# Patient Record
Sex: MALE | HISPANIC OR LATINO | Employment: FULL TIME | URBAN - METROPOLITAN AREA
[De-identification: names, ages, dates, MRNs, and addresses within clinical notes are randomized per-mention and may not be internally consistent; named-entity substitution may affect disease eponyms.]

---

## 2022-12-12 ENCOUNTER — HOSPITAL ENCOUNTER (EMERGENCY)
Facility: CLINIC | Age: 32
Discharge: HOME | End: 2022-12-12
Attending: EMERGENCY MEDICINE | Admitting: EMERGENCY MEDICINE
Payer: COMMERCIAL

## 2022-12-12 VITALS
OXYGEN SATURATION: 99 % | RESPIRATION RATE: 20 BRPM | HEART RATE: 81 BPM | DIASTOLIC BLOOD PRESSURE: 79 MMHG | SYSTOLIC BLOOD PRESSURE: 136 MMHG | TEMPERATURE: 97 F

## 2022-12-12 DIAGNOSIS — K29.70 GASTRITIS WITHOUT BLEEDING, UNSPECIFIED CHRONICITY, UNSPECIFIED GASTRITIS TYPE: ICD-10-CM

## 2022-12-12 DIAGNOSIS — R10.9 ACUTE ABDOMINAL PAIN: ICD-10-CM

## 2022-12-12 LAB
ALBUMIN SERPL BCG-MCNC: 4.4 G/DL (ref 3.5–5.2)
ALBUMIN UR-MCNC: 50 MG/DL
ALP SERPL-CCNC: 117 U/L (ref 40–129)
ALT SERPL W P-5'-P-CCNC: 21 U/L (ref 10–50)
ANION GAP SERPL CALCULATED.3IONS-SCNC: 10 MMOL/L (ref 7–15)
APPEARANCE UR: CLEAR
AST SERPL W P-5'-P-CCNC: 23 U/L (ref 10–50)
ATRIAL RATE - MUSE: 63 BPM
BACTERIA #/AREA URNS HPF: ABNORMAL /HPF
BASOPHILS # BLD AUTO: 0 10E3/UL (ref 0–0.2)
BASOPHILS NFR BLD AUTO: 1 %
BILIRUB DIRECT SERPL-MCNC: <0.2 MG/DL (ref 0–0.3)
BILIRUB SERPL-MCNC: 0.2 MG/DL
BILIRUB UR QL STRIP: NEGATIVE
BUN SERPL-MCNC: 12.8 MG/DL (ref 6–20)
CALCIUM SERPL-MCNC: 9.4 MG/DL (ref 8.6–10)
CHLORIDE SERPL-SCNC: 102 MMOL/L (ref 98–107)
COLOR UR AUTO: YELLOW
CREAT SERPL-MCNC: 0.96 MG/DL (ref 0.67–1.17)
DEPRECATED HCO3 PLAS-SCNC: 29 MMOL/L (ref 22–29)
DIASTOLIC BLOOD PRESSURE - MUSE: NORMAL MMHG
EOSINOPHIL # BLD AUTO: 0 10E3/UL (ref 0–0.7)
EOSINOPHIL NFR BLD AUTO: 1 %
ERYTHROCYTE [DISTWIDTH] IN BLOOD BY AUTOMATED COUNT: 13.4 % (ref 10–15)
FLUAV RNA SPEC QL NAA+PROBE: NEGATIVE
FLUBV RNA RESP QL NAA+PROBE: NEGATIVE
GFR SERPL CREATININE-BSD FRML MDRD: >90 ML/MIN/1.73M2
GLUCOSE SERPL-MCNC: 126 MG/DL (ref 70–99)
GLUCOSE UR STRIP-MCNC: NEGATIVE MG/DL
HCT VFR BLD AUTO: 46.4 % (ref 40–53)
HGB BLD-MCNC: 15 G/DL (ref 13.3–17.7)
HGB UR QL STRIP: NEGATIVE
HOLD SPECIMEN: NORMAL
HOLD SPECIMEN: NORMAL
IMM GRANULOCYTES # BLD: 0 10E3/UL
IMM GRANULOCYTES NFR BLD: 0 %
INTERPRETATION ECG - MUSE: NORMAL
KETONES UR STRIP-MCNC: NEGATIVE MG/DL
LEUKOCYTE ESTERASE UR QL STRIP: NEGATIVE
LIPASE SERPL-CCNC: 31 U/L (ref 13–60)
LYMPHOCYTES # BLD AUTO: 2.5 10E3/UL (ref 0.8–5.3)
LYMPHOCYTES NFR BLD AUTO: 44 %
MCH RBC QN AUTO: 25.4 PG (ref 26.5–33)
MCHC RBC AUTO-ENTMCNC: 32.3 G/DL (ref 31.5–36.5)
MCV RBC AUTO: 79 FL (ref 78–100)
MONOCYTES # BLD AUTO: 0.5 10E3/UL (ref 0–1.3)
MONOCYTES NFR BLD AUTO: 8 %
MUCOUS THREADS #/AREA URNS LPF: PRESENT /LPF
NEUTROPHILS # BLD AUTO: 2.6 10E3/UL (ref 1.6–8.3)
NEUTROPHILS NFR BLD AUTO: 46 %
NITRATE UR QL: NEGATIVE
NRBC # BLD AUTO: 0 10E3/UL
NRBC BLD AUTO-RTO: 0 /100
P AXIS - MUSE: 47 DEGREES
PH UR STRIP: 6.5 [PH] (ref 5–7)
PLATELET # BLD AUTO: 227 10E3/UL (ref 150–450)
POTASSIUM SERPL-SCNC: 3.8 MMOL/L (ref 3.4–5.3)
PR INTERVAL - MUSE: 150 MS
PROT SERPL-MCNC: 7.7 G/DL (ref 6.4–8.3)
QRS DURATION - MUSE: 84 MS
QT - MUSE: 396 MS
QTC - MUSE: 405 MS
R AXIS - MUSE: 83 DEGREES
RBC # BLD AUTO: 5.91 10E6/UL (ref 4.4–5.9)
RBC URINE: 3 /HPF
RSV RNA SPEC NAA+PROBE: NEGATIVE
SARS-COV-2 RNA RESP QL NAA+PROBE: NEGATIVE
SODIUM SERPL-SCNC: 141 MMOL/L (ref 136–145)
SP GR UR STRIP: 1.03 (ref 1–1.03)
SPERM #/AREA URNS HPF: PRESENT /HPF
SQUAMOUS EPITHELIAL: <1 /HPF
SYSTOLIC BLOOD PRESSURE - MUSE: NORMAL MMHG
T AXIS - MUSE: 57 DEGREES
UROBILINOGEN UR STRIP-MCNC: 2 MG/DL
VENTRICULAR RATE- MUSE: 63 BPM
WBC # BLD AUTO: 5.6 10E3/UL (ref 4–11)
WBC URINE: 2 /HPF

## 2022-12-12 PROCEDURE — 250N000011 HC RX IP 250 OP 636: Performed by: EMERGENCY MEDICINE

## 2022-12-12 PROCEDURE — 36415 COLL VENOUS BLD VENIPUNCTURE: CPT | Performed by: EMERGENCY MEDICINE

## 2022-12-12 PROCEDURE — 99284 EMERGENCY DEPT VISIT MOD MDM: CPT | Mod: 25

## 2022-12-12 PROCEDURE — 87637 SARSCOV2&INF A&B&RSV AMP PRB: CPT | Performed by: EMERGENCY MEDICINE

## 2022-12-12 PROCEDURE — 96375 TX/PRO/DX INJ NEW DRUG ADDON: CPT

## 2022-12-12 PROCEDURE — 85025 COMPLETE CBC W/AUTO DIFF WBC: CPT | Performed by: EMERGENCY MEDICINE

## 2022-12-12 PROCEDURE — 93005 ELECTROCARDIOGRAM TRACING: CPT

## 2022-12-12 PROCEDURE — 96374 THER/PROPH/DIAG INJ IV PUSH: CPT

## 2022-12-12 PROCEDURE — 80053 COMPREHEN METABOLIC PANEL: CPT | Performed by: EMERGENCY MEDICINE

## 2022-12-12 PROCEDURE — 81003 URINALYSIS AUTO W/O SCOPE: CPT | Performed by: EMERGENCY MEDICINE

## 2022-12-12 PROCEDURE — 258N000003 HC RX IP 258 OP 636: Performed by: EMERGENCY MEDICINE

## 2022-12-12 PROCEDURE — C9803 HOPD COVID-19 SPEC COLLECT: HCPCS

## 2022-12-12 PROCEDURE — 82248 BILIRUBIN DIRECT: CPT | Performed by: EMERGENCY MEDICINE

## 2022-12-12 PROCEDURE — 96361 HYDRATE IV INFUSION ADD-ON: CPT

## 2022-12-12 PROCEDURE — 83690 ASSAY OF LIPASE: CPT | Performed by: EMERGENCY MEDICINE

## 2022-12-12 RX ORDER — ONDANSETRON 2 MG/ML
4 INJECTION INTRAMUSCULAR; INTRAVENOUS ONCE
Status: COMPLETED | OUTPATIENT
Start: 2022-12-12 | End: 2022-12-12

## 2022-12-12 RX ORDER — KETOROLAC TROMETHAMINE 15 MG/ML
10 INJECTION, SOLUTION INTRAMUSCULAR; INTRAVENOUS ONCE
Status: COMPLETED | OUTPATIENT
Start: 2022-12-12 | End: 2022-12-12

## 2022-12-12 RX ORDER — FAMOTIDINE 20 MG/1
20 TABLET, FILM COATED ORAL 2 TIMES DAILY
Qty: 40 TABLET | Refills: 0 | Status: SHIPPED | OUTPATIENT
Start: 2022-12-12

## 2022-12-12 RX ORDER — ONDANSETRON 4 MG/1
4 TABLET, ORALLY DISINTEGRATING ORAL EVERY 8 HOURS PRN
Qty: 10 TABLET | Refills: 0 | Status: SHIPPED | OUTPATIENT
Start: 2022-12-12 | End: 2022-12-15

## 2022-12-12 RX ADMIN — KETOROLAC TROMETHAMINE 10 MG: 15 INJECTION, SOLUTION INTRAMUSCULAR; INTRAVENOUS at 07:09

## 2022-12-12 RX ADMIN — ONDANSETRON 4 MG: 2 INJECTION INTRAMUSCULAR; INTRAVENOUS at 05:06

## 2022-12-12 RX ADMIN — FAMOTIDINE 20 MG: 10 INJECTION, SOLUTION INTRAVENOUS at 07:10

## 2022-12-12 RX ADMIN — SODIUM CHLORIDE 1000 ML: 9 INJECTION, SOLUTION INTRAVENOUS at 05:06

## 2022-12-12 RX ADMIN — ONDANSETRON 4 MG: 2 INJECTION INTRAMUSCULAR; INTRAVENOUS at 07:10

## 2022-12-12 ASSESSMENT — ENCOUNTER SYMPTOMS
NAUSEA: 1
ABDOMINAL PAIN: 1
CONSTIPATION: 0
VOMITING: 1
DIARRHEA: 0
FEVER: 0

## 2022-12-12 ASSESSMENT — ACTIVITIES OF DAILY LIVING (ADL): ADLS_ACUITY_SCORE: 35

## 2022-12-12 NOTE — LETTER
December 12, 2022      To Whom It May Concern:      Skyla Powers was seen in our Emergency Department today, 12/12/22.  I expect his condition to improve over the next 1-2 days.  He may return to work/school when improved.    Sincerely,        Lorrie Aguilar RN

## 2022-12-12 NOTE — ED PROVIDER NOTES
History   Chief Complaint:  Abdominal Pain      HPI   Skyla Powers is a 32 year old male who presents for evaluation of abdominal pain. Yesterday evening around 2200 the patient started to develop generalize abdominal pain with associated nausea and vomiting. Due to his ongoing symptoms this morning he came into the ED for evaluation. He denies any recent fever, diarrhea, or constipation. He expresses concern that his symptoms could be related to eating some bad chicken.     Review of Systems   Constitutional: Negative for fever.   Gastrointestinal: Positive for abdominal pain, nausea and vomiting. Negative for constipation and diarrhea.   All other systems reviewed and are negative.      Allergies:  Penicillins     Medications:  The patient is not currently taking any prescribed medications.     Past Medical History:     The patient denies any past medical history.      Past Surgical History:    The patient denies any past surgical history.      Social History:  The patient presents to the ED accompanied by a family member.       Physical Exam     Patient Vitals for the past 24 hrs:   BP Temp Temp src Pulse Resp SpO2   12/12/22 0455 136/79 97  F (36.1  C) Temporal 81 20 99 %       Physical Exam  Constitutional:  Oriented to person, place, and time.   HENT:   Head:    Normocephalic.   Mouth/Throat:   Oropharynx is clear and moist.   Eyes:    EOM are normal. Pupils are equal, round, and reactive to light.   Neck:    Neck supple.   Cardiovascular:  Normal rate, regular rhythm and normal heart sounds.      Exam reveals no gallop and no friction rub.       No murmur heard.  Pulmonary/Chest:  Effort normal and breath sounds normal.      No respiratory distress. No wheezes. No rales.      No reproducible chest wall pain.  Abdominal:   Soft. No distension. No tenderness. No rebound and no guarding. Negative Jefferson's sign. No pain with percussion to flanks.   Musculoskeletal:  Normal range of motion.    Neurological:   Alert and oriented to person, place, and time.           Moves all 4 extremities spontaneously    Skin:    No rash noted. No pallor.      Emergency Department Course   ECG  ECG results from 12/12/22   EKG 12 lead     Value    Systolic Blood Pressure     Diastolic Blood Pressure     Ventricular Rate 63    Atrial Rate 63    VT Interval 150    QRS Duration 84        QTc 405    P Axis 47    R AXIS 83    T Axis 57    Interpretation ECG      Sinus rhythm with sinus arrhythmia  Normal ECG  No previous ECGs available             Laboratory:  Labs Ordered and Resulted from Time of ED Arrival to Time of ED Departure   BASIC METABOLIC PANEL - Abnormal       Result Value    Sodium 141      Potassium 3.8      Chloride 102      Carbon Dioxide (CO2) 29      Anion Gap 10      Urea Nitrogen 12.8      Creatinine 0.96      Calcium 9.4      Glucose 126 (*)     GFR Estimate >90     CBC WITH PLATELETS AND DIFFERENTIAL - Abnormal    WBC Count 5.6      RBC Count 5.91 (*)     Hemoglobin 15.0      Hematocrit 46.4      MCV 79      MCH 25.4 (*)     MCHC 32.3      RDW 13.4      Platelet Count 227      % Neutrophils 46      % Lymphocytes 44      % Monocytes 8      % Eosinophils 1      % Basophils 1      % Immature Granulocytes 0      NRBCs per 100 WBC 0      Absolute Neutrophils 2.6      Absolute Lymphocytes 2.5      Absolute Monocytes 0.5      Absolute Eosinophils 0.0      Absolute Basophils 0.0      Absolute Immature Granulocytes 0.0      Absolute NRBCs 0.0     ROUTINE UA WITH MICROSCOPIC REFLEX TO CULTURE - Abnormal    Color Urine Yellow      Appearance Urine Clear      Glucose Urine Negative      Bilirubin Urine Negative      Ketones Urine Negative      Specific Gravity Urine 1.029      Blood Urine Negative      pH Urine 6.5      Protein Albumin Urine 50 (*)     Urobilinogen Urine 2.0      Nitrite Urine Negative      Leukocyte Esterase Urine Negative      Bacteria Urine Few (*)     Mucus Urine Present (*)     Sperm  Urine Present (*)     RBC Urine 3 (*)     WBC Urine 2      Squamous Epithelials Urine <1     HEPATIC FUNCTION PANEL - Normal    Protein Total 7.7      Albumin 4.4      Bilirubin Total 0.2      Alkaline Phosphatase 117      AST 23      ALT 21      Bilirubin Direct <0.20     LIPASE - Normal    Lipase 31     INFLUENZA A/B & SARS-COV2 PCR MULTIPLEX - Normal    Influenza A PCR Negative      Influenza B PCR Negative      RSV PCR Negative      SARS CoV2 PCR Negative        Emergency Department Course:     Reviewed:  I reviewed nursing notes, vitals and past medical history    Assessments:  0529:  I obtained history and examined the patient as noted above.    I rechecked the patient and explained findings.      Interventions:  0506 NS 1,000 mL IV   0506 Zofran 4 mg IV   Pepcid 20 mg IV   GI cocktail 30 mL PO     Disposition:  The patient was discharged to home.     Impression & Plan           Medical Decision Makin-year-old male that came in complaining of upper abdominal pain and vomiting after eating chicken.  Differential includes gastritis, gastroenteritis, pancreatitis, biliary colic, small bowel obstruction, acute appendicitis, or other causes.  He has an absolute benign abdomen with no reproducible tenderness and reassuring laboratory work.  My suspicion is low for acute appendicitis I did have a long discussion with him about a wait-and-see approach and to avoid unnecessary CT imaging out of concern for radiation exposure.  My suspicion is for gastritis although this remains to be seen.  He was discharged home with a course of Pepcid and Zofran is told to continue with oral hydration and should return for new or worsening abdominal pain fever vomiting not tolerating p.o.        Diagnosis:    ICD-10-CM    1. Acute abdominal pain  R10.9       2. Gastritis without bleeding, unspecified chronicity, unspecified gastritis type  K29.70           Discharge Medications:  Discharge Medication List as of 2022  7:31  AM      START taking these medications    Details   famotidine (PEPCID) 20 MG tablet Take 1 tablet (20 mg) by mouth 2 times daily, Disp-40 tablet, R-0, Local Print      ondansetron (ZOFRAN ODT) 4 MG ODT tab Take 1 tablet (4 mg) by mouth every 8 hours as needed for nausea, Disp-10 tablet, R-0, Local Print             Scribe Disclosure:  I, Brown Randolph, am serving as a scribe at 5:29 AM on 12/12/2022 to document services personally performed by Dr. Dewitt, based on my observations and the provider's statements to me.           Juan Carlos Dewitt MD  12/12/22 2024

## 2022-12-14 ENCOUNTER — HOSPITAL ENCOUNTER (OUTPATIENT)
Facility: CLINIC | Age: 32
End: 2022-12-14
Attending: SURGERY | Admitting: SURGERY
Payer: COMMERCIAL

## 2022-12-14 ENCOUNTER — ANESTHESIA EVENT (OUTPATIENT)
Dept: SURGERY | Facility: CLINIC | Age: 32
End: 2022-12-14
Payer: COMMERCIAL

## 2022-12-14 ENCOUNTER — APPOINTMENT (OUTPATIENT)
Dept: CT IMAGING | Facility: CLINIC | Age: 32
End: 2022-12-14
Attending: EMERGENCY MEDICINE
Payer: COMMERCIAL

## 2022-12-14 ENCOUNTER — HOSPITAL ENCOUNTER (OUTPATIENT)
Facility: CLINIC | Age: 32
Setting detail: OBSERVATION
Discharge: HOME | End: 2022-12-14
Attending: EMERGENCY MEDICINE | Admitting: SURGERY
Payer: COMMERCIAL

## 2022-12-14 ENCOUNTER — APPOINTMENT (OUTPATIENT)
Dept: ULTRASOUND IMAGING | Facility: CLINIC | Age: 32
End: 2022-12-14
Attending: EMERGENCY MEDICINE
Payer: COMMERCIAL

## 2022-12-14 ENCOUNTER — PREP FOR PROCEDURE (OUTPATIENT)
Dept: SURGERY | Facility: CLINIC | Age: 32
End: 2022-12-14

## 2022-12-14 VITALS
WEIGHT: 150 LBS | RESPIRATION RATE: 16 BRPM | TEMPERATURE: 98.5 F | DIASTOLIC BLOOD PRESSURE: 80 MMHG | HEART RATE: 82 BPM | SYSTOLIC BLOOD PRESSURE: 119 MMHG | HEIGHT: 71 IN | OXYGEN SATURATION: 98 % | BODY MASS INDEX: 21 KG/M2

## 2022-12-14 DIAGNOSIS — K81.0 ACUTE CHOLECYSTITIS: Primary | ICD-10-CM

## 2022-12-14 LAB
ALBUMIN SERPL-MCNC: 3.3 G/DL (ref 3.4–5)
ALBUMIN UR-MCNC: 30 MG/DL
ALP SERPL-CCNC: 102 U/L (ref 40–150)
ALT SERPL W P-5'-P-CCNC: 26 U/L (ref 0–70)
ANION GAP SERPL CALCULATED.3IONS-SCNC: 6 MMOL/L (ref 3–14)
APPEARANCE UR: CLEAR
AST SERPL W P-5'-P-CCNC: 16 U/L (ref 0–45)
ATRIAL RATE - MUSE: 84 BPM
BASOPHILS # BLD AUTO: 0 10E3/UL (ref 0–0.2)
BASOPHILS NFR BLD AUTO: 0 %
BILIRUB SERPL-MCNC: 0.7 MG/DL (ref 0.2–1.3)
BILIRUB UR QL STRIP: NEGATIVE
BUN SERPL-MCNC: 7 MG/DL (ref 7–30)
CALCIUM SERPL-MCNC: 8.8 MG/DL (ref 8.5–10.1)
CHLORIDE BLD-SCNC: 102 MMOL/L (ref 94–109)
CO2 SERPL-SCNC: 31 MMOL/L (ref 20–32)
COLOR UR AUTO: YELLOW
CREAT SERPL-MCNC: 0.89 MG/DL (ref 0.66–1.25)
DIASTOLIC BLOOD PRESSURE - MUSE: NORMAL MMHG
EOSINOPHIL # BLD AUTO: 0.2 10E3/UL (ref 0–0.7)
EOSINOPHIL NFR BLD AUTO: 2 %
ERYTHROCYTE [DISTWIDTH] IN BLOOD BY AUTOMATED COUNT: 13.4 % (ref 10–15)
GFR SERPL CREATININE-BSD FRML MDRD: >90 ML/MIN/1.73M2
GLUCOSE BLD-MCNC: 106 MG/DL (ref 70–99)
GLUCOSE UR STRIP-MCNC: NEGATIVE MG/DL
HCT VFR BLD AUTO: 43.6 % (ref 40–53)
HGB BLD-MCNC: 14.6 G/DL (ref 13.3–17.7)
HGB UR QL STRIP: ABNORMAL
HOLD SPECIMEN: NORMAL
IMM GRANULOCYTES # BLD: 0 10E3/UL
IMM GRANULOCYTES NFR BLD: 0 %
INTERPRETATION ECG - MUSE: NORMAL
KETONES UR STRIP-MCNC: ABNORMAL MG/DL
LEUKOCYTE ESTERASE UR QL STRIP: NEGATIVE
LIPASE SERPL-CCNC: 55 U/L (ref 73–393)
LYMPHOCYTES # BLD AUTO: 1.9 10E3/UL (ref 0.8–5.3)
LYMPHOCYTES NFR BLD AUTO: 20 %
MCH RBC QN AUTO: 25.6 PG (ref 26.5–33)
MCHC RBC AUTO-ENTMCNC: 33.5 G/DL (ref 31.5–36.5)
MCV RBC AUTO: 77 FL (ref 78–100)
MONOCYTES # BLD AUTO: 0.9 10E3/UL (ref 0–1.3)
MONOCYTES NFR BLD AUTO: 9 %
MUCOUS THREADS #/AREA URNS LPF: PRESENT /LPF
NEUTROPHILS # BLD AUTO: 6.2 10E3/UL (ref 1.6–8.3)
NEUTROPHILS NFR BLD AUTO: 69 %
NITRATE UR QL: NEGATIVE
NRBC # BLD AUTO: 0 10E3/UL
NRBC BLD AUTO-RTO: 0 /100
P AXIS - MUSE: 67 DEGREES
PH UR STRIP: 6 [PH] (ref 5–7)
PLATELET # BLD AUTO: 185 10E3/UL (ref 150–450)
POTASSIUM BLD-SCNC: 3.7 MMOL/L (ref 3.4–5.3)
PR INTERVAL - MUSE: 136 MS
PROT SERPL-MCNC: 7.6 G/DL (ref 6.8–8.8)
QRS DURATION - MUSE: 84 MS
QT - MUSE: 328 MS
QTC - MUSE: 387 MS
R AXIS - MUSE: 93 DEGREES
RBC # BLD AUTO: 5.7 10E6/UL (ref 4.4–5.9)
RBC URINE: 10 /HPF
SODIUM SERPL-SCNC: 139 MMOL/L (ref 133–144)
SP GR UR STRIP: 1.03 (ref 1–1.03)
SYSTOLIC BLOOD PRESSURE - MUSE: NORMAL MMHG
T AXIS - MUSE: 60 DEGREES
UROBILINOGEN UR STRIP-MCNC: 2 MG/DL
VENTRICULAR RATE- MUSE: 84 BPM
WBC # BLD AUTO: 9.2 10E3/UL (ref 4–11)
WBC URINE: 6 /HPF

## 2022-12-14 PROCEDURE — 96374 THER/PROPH/DIAG INJ IV PUSH: CPT | Mod: 59

## 2022-12-14 PROCEDURE — 36415 COLL VENOUS BLD VENIPUNCTURE: CPT | Performed by: EMERGENCY MEDICINE

## 2022-12-14 PROCEDURE — 250N000011 HC RX IP 250 OP 636: Performed by: EMERGENCY MEDICINE

## 2022-12-14 PROCEDURE — 76705 ECHO EXAM OF ABDOMEN: CPT

## 2022-12-14 PROCEDURE — 83690 ASSAY OF LIPASE: CPT | Performed by: EMERGENCY MEDICINE

## 2022-12-14 PROCEDURE — 93005 ELECTROCARDIOGRAM TRACING: CPT

## 2022-12-14 PROCEDURE — 85025 COMPLETE CBC W/AUTO DIFF WBC: CPT | Performed by: EMERGENCY MEDICINE

## 2022-12-14 PROCEDURE — 99285 EMERGENCY DEPT VISIT HI MDM: CPT | Mod: 25

## 2022-12-14 PROCEDURE — 74177 CT ABD & PELVIS W/CONTRAST: CPT

## 2022-12-14 PROCEDURE — 82040 ASSAY OF SERUM ALBUMIN: CPT | Performed by: EMERGENCY MEDICINE

## 2022-12-14 PROCEDURE — 99204 OFFICE O/P NEW MOD 45 MIN: CPT | Mod: 57 | Performed by: SURGERY

## 2022-12-14 PROCEDURE — 81001 URINALYSIS AUTO W/SCOPE: CPT | Performed by: EMERGENCY MEDICINE

## 2022-12-14 PROCEDURE — 80053 COMPREHEN METABOLIC PANEL: CPT | Performed by: EMERGENCY MEDICINE

## 2022-12-14 PROCEDURE — 250N000009 HC RX 250: Performed by: EMERGENCY MEDICINE

## 2022-12-14 PROCEDURE — 96365 THER/PROPH/DIAG IV INF INIT: CPT

## 2022-12-14 PROCEDURE — G0378 HOSPITAL OBSERVATION PER HR: HCPCS

## 2022-12-14 PROCEDURE — 96375 TX/PRO/DX INJ NEW DRUG ADDON: CPT

## 2022-12-14 RX ORDER — IOPAMIDOL 755 MG/ML
62 INJECTION, SOLUTION INTRAVASCULAR ONCE
Status: COMPLETED | OUTPATIENT
Start: 2022-12-14 | End: 2022-12-14

## 2022-12-14 RX ORDER — CEFTRIAXONE 2 G/1
2 INJECTION, POWDER, FOR SOLUTION INTRAMUSCULAR; INTRAVENOUS ONCE
Status: COMPLETED | OUTPATIENT
Start: 2022-12-14 | End: 2022-12-14

## 2022-12-14 RX ORDER — KETOROLAC TROMETHAMINE 15 MG/ML
15 INJECTION, SOLUTION INTRAMUSCULAR; INTRAVENOUS ONCE
Status: COMPLETED | OUTPATIENT
Start: 2022-12-14 | End: 2022-12-14

## 2022-12-14 RX ORDER — ACETAMINOPHEN 325 MG/1
325-650 TABLET ORAL EVERY 6 HOURS PRN
COMMUNITY

## 2022-12-14 RX ORDER — MULTIPLE VITAMINS W/ MINERALS TAB 9MG-400MCG
1 TAB ORAL DAILY
COMMUNITY

## 2022-12-14 RX ORDER — CEFAZOLIN SODIUM 2 G/100ML
2 INJECTION, SOLUTION INTRAVENOUS
Status: CANCELLED | OUTPATIENT
Start: 2022-12-14

## 2022-12-14 RX ORDER — CEFAZOLIN SODIUM 2 G/100ML
2 INJECTION, SOLUTION INTRAVENOUS SEE ADMIN INSTRUCTIONS
Status: CANCELLED | OUTPATIENT
Start: 2022-12-14

## 2022-12-14 RX ADMIN — IOPAMIDOL 62 ML: 755 INJECTION, SOLUTION INTRAVENOUS at 10:08

## 2022-12-14 RX ADMIN — SODIUM CHLORIDE 100 ML: 900 INJECTION INTRAVENOUS at 10:08

## 2022-12-14 RX ADMIN — CEFTRIAXONE SODIUM 2 G: 2 INJECTION, POWDER, FOR SOLUTION INTRAMUSCULAR; INTRAVENOUS at 12:21

## 2022-12-14 RX ADMIN — KETOROLAC TROMETHAMINE 15 MG: 15 INJECTION, SOLUTION INTRAMUSCULAR; INTRAVENOUS at 10:21

## 2022-12-14 ASSESSMENT — ACTIVITIES OF DAILY LIVING (ADL): ADLS_ACUITY_SCORE: 35

## 2022-12-14 ASSESSMENT — ENCOUNTER SYMPTOMS
CHILLS: 1
APPETITE CHANGE: 1
DYSURIA: 0
CONSTIPATION: 0
VOMITING: 0
ABDOMINAL PAIN: 1

## 2022-12-14 NOTE — ED PROVIDER NOTES
"  History   Chief Complaint:  Abdominal Pain       HPI   Skyla Powers is a 32 year old male who presents with constant right lower quadrant pain since Monday morning which is worse with ambulation and standing. Patient reports eating spoiled chicken Sunday night after which he had a few hours of pain that then resolved. His pain returned Monday morning and he was seen in [facility] ED. Patient was suspected to have a stomach flu and was sent home on Pepcid and Zofran. He endorses loss of appetite and chills but denies dysuria, constipation, vomiting.    Review of Systems   Constitutional: Positive for appetite change and chills.   Gastrointestinal: Positive for abdominal pain. Negative for constipation and vomiting.   Genitourinary: Negative for dysuria.   All other systems reviewed and are negative.    Allergies:  Pcn [Penicillins]    Medications:  Pepcid    Past Medical History:     The patient denies any significant past medical history.    Social History:  The patient presents to the ED with parents     Physical Exam     Patient Vitals for the past 24 hrs:   BP Temp Temp src Pulse Resp SpO2 Height Weight   12/14/22 1230 113/80 -- -- 82 -- 97 % -- --   12/14/22 1215 115/77 -- -- -- -- 97 % -- --   12/14/22 1130 111/82 -- -- -- -- 97 % -- --   12/14/22 0916 119/66 98.5  F (36.9  C) Oral 100 16 100 % 1.803 m (5' 11\") 68 kg (150 lb)       Physical Exam  Eye:  Pupils are equal, round, and reactive.  Extraocular movements intact.    ENT:  No rhinorrhea.  Moist mucus membranes.  Normal tongue and tonsil.    Cardiac:  Regular rate and rhythm.  No murmurs, gallops, or rubs.    Pulmonary:  Clear to auscultation bilaterally.  No wheezes, rales, or rhonchi.    Abdomen:  there is focal tenderness to the right upper abdominal with some radiation to CVA area. positive rebound and guarding     Musculoskeletal:  Normal movement of all extremities without evidence for deficit.    Skin:  Warm and dry without " rashes.    Neurologic:  Non-focal exam without asymmetric weakness or numbness.     Psychiatric:  Normal affect with appropriate interaction with examiner.     Emergency Department Course   ECG  ECG results from 12/14/22   EKG 12-lead, tracing only     Value    Systolic Blood Pressure     Diastolic Blood Pressure     Ventricular Rate 84    Atrial Rate 84    MA Interval 136    QRS Duration 84        QTc 387    P Axis 67    R AXIS 93    T Axis 60    Interpretation ECG      Sinus rhythm  Rightward axis  Borderline ECG  When compared with ECG of 12-DEC-2022 06:31,  No significant change was found  Confirmed by GENERATED REPORT, COMPUTER ([id number]),  Lazara Villarreal ([id number]) on 12/14/2022 9:44:52 AM         Imaging:  Abdomen US, limited (RUQ only)   Preliminary Result   IMPRESSION:   1.  Findings are suspicious for acute cholecystitis.   2.  No biliary dilatation.      CT Abdomen Pelvis w Contrast   Final Result   IMPRESSION:    1.  The gallbladder wall is diffusely thickened and enhancing. Acute   cholecystitis could have this appearance. Gallbladder ultrasound would   be recommended for further evaluation.   2.  Small amount of nonspecific free fluid in the pelvis.      AIDAN MOTA MD            SYSTEM ID:  [id number]        Report per radiology    Laboratory:  Labs Ordered and Resulted from Time of ED Arrival to Time of ED Departure   COMPREHENSIVE METABOLIC PANEL - Abnormal       Result Value    Sodium 139      Potassium 3.7      Chloride 102      Carbon Dioxide (CO2) 31      Anion Gap 6      Urea Nitrogen 7      Creatinine 0.89      Calcium 8.8      Glucose 106 (*)     Alkaline Phosphatase 102      AST 16      ALT 26      Protein Total 7.6      Albumin 3.3 (*)     Bilirubin Total 0.7      GFR Estimate >90     LIPASE - Abnormal    Lipase 55 (*)    ROUTINE UA WITH MICROSCOPIC REFLEX TO CULTURE - Abnormal    Color Urine Yellow      Appearance Urine Clear      Glucose Urine Negative      Bilirubin Urine  Negative      Ketones Urine Trace (*)     Specific Gravity Urine 1.030      Blood Urine Small (*)     pH Urine 6.0      Protein Albumin Urine 30 (*)     Urobilinogen Urine 2.0      Nitrite Urine Negative      Leukocyte Esterase Urine Negative      Mucus Urine Present (*)     RBC Urine 10 (*)     WBC Urine 6 (*)    CBC WITH PLATELETS AND DIFFERENTIAL - Abnormal    WBC Count 9.2      RBC Count 5.70      Hemoglobin 14.6      Hematocrit 43.6      MCV 77 (*)     MCH 25.6 (*)     MCHC 33.5      RDW 13.4      Platelet Count 185      % Neutrophils 69      % Lymphocytes 20      % Monocytes 9      % Eosinophils 2      % Basophils 0      % Immature Granulocytes 0      NRBCs per 100 WBC 0      Absolute Neutrophils 6.2      Absolute Lymphocytes 1.9      Absolute Monocytes 0.9      Absolute Eosinophils 0.2      Absolute Basophils 0.0      Absolute Immature Granulocytes 0.0      Absolute NRBCs 0.0          Emergency Department Course:  Reviewed:  I reviewed nursing notes, vitals, past medical history and Care Everywhere    Assessments:  0950 I obtained history and examined the patient as noted above.   1054 I rechecked the patient and explained findings.     Consults:  1151 I spoke with Dr. John pimentel of general surgery regarding patient's presentation, findings, and plan of care.      Interventions:  1021 Toradol, 15 mg, IV   1221 Rocephin, 2 g IV     Disposition:  The patient was discharged to home.     Impression & Plan   Medical Decision Making:  This very pleasant 32-year-old man presents to us with 3 to 4 days of progressive right upper quadrant abdominal pain.  He was seen at an outside hospital with otherwise unremarkable labs and this was all thought to be related to gastroenteritis.  However, with pain persisting and now worsening, he elected to come in today.  While his pain was higher in the abdomen more lateral than I would normally see with appendicitis, appendicitis would still be the most likely cause considering  his age, gender, and body habitus.  Therefore, CT of the abdomen was obtained.  While his appendix appears normal, he has a very enlarged and swollen gallbladder.  Cholecystitis is confirmed on ultrasound with multiple gallstones.    With this, I spoke with Dr. Shaffer of general surgery.  He was evaluated in the emergency department by the surgeon.  Unfortunately, there is no availability in the OR this afternoon.  Because the patient is otherwise very stable with normal labs, Dr. Shaffer recommended discharge home with plans for outpatient cholecystectomy in the morning.  If the surgeon feels comfortable with this, I do not feel it is unreasonable.  The patient was given 1 dose of Rocephin to cover for intra-abdominal infection for the next 24 hours.  Otherwise, he was advised that he should return to us immediately if he develops worsening of his pain or if there are other emergent concerns.  He will return in the morning based on the general surgeon's discharge plan.    Diagnosis:    ICD-10-CM    1. Acute cholecystitis  K81.0 Case Request: CHOLECYSTECTOMY, LAPAROSCOPIC     Case Request: CHOLECYSTECTOMY, LAPAROSCOPIC           Scribe Disclosure:  TED, Guilherme Sauceda, am serving as a scribe at 9:40 AM on 12/14/2022 to document services personally performed by Trierweiler, Chad A, MD based on my observations and the provider's statements to me.      Trierweiler, Chad A, MD  12/15/22 0071

## 2022-12-14 NOTE — H&P (VIEW-ONLY)
General Surgery emergency room consultation/H&P    Skyla Powers MRN#: [id number]   Age: 32 year old YOB: 1990     Date of Admission:          12/14/2022  Reason for consult/H&P:  Gallbladder disease   Surgeon:      John Shaffer MD                  Chief Complaint:   Abdominal pain, epigastric         History of Present Illness:   This patient is a 32 year old  male who presented to the [facility] ER with abdominal pain for the last 3 days.  He felt that he ate some bad food on Sunday and thought the discomfort was due to that but it persisted.  It is now mainly in his upper abdomen a bit more to the right than on the left.  He has not had similar problems before and has never been jaundiced.  He has had no trauma to the abdomen.. Denies fever, chills, , vomiting, change in BM or urination.   Denies having any previous  abdominal surgery. History is obtained from the patient and chart.         Past Medical History:    has no past medical history on file.          Past Surgical History:   No past surgical history on file.         Medications:     Prior to Admission medications    Medication Sig Start Date End Date Taking? Authorizing Provider   acetaminophen (TYLENOL) 325 MG tablet Take 325-650 mg by mouth every 6 hours as needed for mild pain   Yes Unknown, Entered By History   multivitamin w/minerals (MULTI-VITAMIN) tablet Take 1 tablet by mouth daily   Yes Unknown, Entered By History   omeprazole (PRILOSEC) 20 MG DR capsule Take 20 mg by mouth every 8 hours as needed   Yes Unknown, Entered By History   ondansetron (ZOFRAN ODT) 4 MG ODT tab Take 1 tablet (4 mg) by mouth every 8 hours as needed for nausea 12/12/22 12/15/22 Yes Juan Carlos Dewitt MD   famotidine (PEPCID) 20 MG tablet Take 1 tablet (20 mg) by mouth 2 times daily 12/12/22   Juan Carlos Dewitt MD            Allergies:     Allergies   Allergen Reactions     Pcn [Penicillins]             Social History:     Social History  "    Tobacco Use     Smoking status: Never     Smokeless tobacco: Not on file   Substance Use Topics     Alcohol use: No             Family History:    This patient has no significant relevant family history.  Family history is reviewed in detail.          Review of Systems:   Complete ROS is negative other than noted in the HPI.  C: NEGATIVE for fever, chills, change in weight  R: NEGATIVE for significant cough or SOB  CV: NEGATIVE for chest pain, palpitations or peripheral edema  GI:  NEGATIVE for dysuria, heartburn, or change in bowel habits  H: NEGATIVE for bleeding problems         Physical Exam:   Blood pressure 119/80, pulse 82, temperature 98.5  F (36.9  C), temperature source Oral, resp. rate 16, height 1.803 m (5' 11\"), weight 68 kg (150 lb), SpO2 98 %.  No intake/output data recorded.    General - This is a well developed, well nourished male .  HEENT - Normocephalic. Atraumatic. Moist mucous membranes. Pupils equal.  No scleral icterus. Nose normal.  Neck - Supple without masses. No cervical adenopathy or thyromegaly  Lungs - Breathing not labored  Chest - Not tender. CVA's nontender  Heart - Regular rate & rhythm   Abdomen - Soft, nontender except for some right upper quadrant discomfort with firm pressure., nondistended with +bowel sounds, no organomegaly.  Extremities - Moves all extremities. Warm without edema. Pulses noted  Neurologic - Nonfocal. Alert and oriented          Data:   Labs:  Recent Labs   Lab Test 12/14/22 0938 12/12/22  0501   WBC 9.2 5.6   HGB 14.6 15.0   HCT 43.6 46.4    227     Recent Labs   Lab Test 12/14/22 0938 12/12/22  0501   POTASSIUM 3.7 3.8   CHLORIDE 102 102   CO2 31 29   BUN 7 12.8   CR 0.89 0.96     Recent Labs   Lab Test 12/14/22 0938 12/12/22  0501   BILITOTAL 0.7 0.2   ALT 26 21   AST 16 23   ALKPHOS 102 117   LIPASE 55* 31     No lab results found.  Recent Labs   Lab Test 12/14/22 0938 12/12/22  0501   LOWELL 8.8 9.4     Recent Labs   Lab Test 12/14/22 0938 " 12/12/22  0618 12/12/22  0501   ANIONGAP 6  --  10   PROTEIN 30* 50*  --    ALBUMIN 3.3*  --  4.4       CT scan of the abdomen: Images reviewed in detail.  Thick-walled gallbladder.  I see nothing else worrisome.    Ultrasound of the abdomen: Images reviewed.  Lots of gallstones and thick-walled gallbladder.               Assessment:   Symptomatic gallstones with acute cholecystitis.  We discussed the options of observation with or without antibiotic therapy.  We also reviewed the benefits of cholecystectomy.  He has a fairly strong family history of gallbladder disease with his mother having had her gallbladder removed.  After reviewing the choices he is chosen to proceed with cholecystectomy in the next day or so.  There is no operating room available today so we will do this tomorrow.         Plan:   Laparoscopic cholecystectomy on 12/15/2022.  Risks reviewed in detail with the patient and his father.       I have discussed the history, physical, and plan with the patient.   John Shaffer M.D.

## 2022-12-14 NOTE — LETTER
December 14, 2022      To Whom It May Concern:      Skyla Powers was seen in our Emergency Department today, 12/14/22.  I expect his condition to improve over the next 2 days.  He may return to work when improved.    Sincerely,        BUD Moore

## 2022-12-14 NOTE — ED NOTES
[facility]  ED Nurse Handoff Report    ED Chief complaint: Abdominal Pain      ED Diagnosis:   Final diagnoses:   Acute cholecystitis       Code Status: admitting physician to determine    Allergies:   Allergies   Allergen Reactions     Pcn [Penicillins]        Patient Story: Pt reports R to mid abdominal pain since Sunday night, reports earlier in the week had some n/v/d.  Pt reports pain is worst when he is up and walking around. Pt denies surgeries to abd   Focused Assessment:  Patient feeling pain, helped with Tordol    Treatments and/or interventions provided:   Labs Ordered and Resulted from Time of ED Arrival to Time of ED Departure   COMPREHENSIVE METABOLIC PANEL - Abnormal       Result Value    Sodium 139      Potassium 3.7      Chloride 102      Carbon Dioxide (CO2) 31      Anion Gap 6      Urea Nitrogen 7      Creatinine 0.89      Calcium 8.8      Glucose 106 (*)     Alkaline Phosphatase 102      AST 16      ALT 26      Protein Total 7.6      Albumin 3.3 (*)     Bilirubin Total 0.7      GFR Estimate >90     LIPASE - Abnormal    Lipase 55 (*)    ROUTINE UA WITH MICROSCOPIC REFLEX TO CULTURE - Abnormal    Color Urine Yellow      Appearance Urine Clear      Glucose Urine Negative      Bilirubin Urine Negative      Ketones Urine Trace (*)     Specific Gravity Urine 1.030      Blood Urine Small (*)     pH Urine 6.0      Protein Albumin Urine 30 (*)     Urobilinogen Urine 2.0      Nitrite Urine Negative      Leukocyte Esterase Urine Negative      Mucus Urine Present (*)     RBC Urine 10 (*)     WBC Urine 6 (*)    CBC WITH PLATELETS AND DIFFERENTIAL - Abnormal    WBC Count 9.2      RBC Count 5.70      Hemoglobin 14.6      Hematocrit 43.6      MCV 77 (*)     MCH 25.6 (*)     MCHC 33.5      RDW 13.4      Platelet Count 185      % Neutrophils 69      % Lymphocytes 20      % Monocytes 9      % Eosinophils 2      % Basophils 0      % Immature Granulocytes 0      NRBCs per 100 WBC 0      Absolute  "Neutrophils 6.2      Absolute Lymphocytes 1.9      Absolute Monocytes 0.9      Absolute Eosinophils 0.2      Absolute Basophils 0.0      Absolute Immature Granulocytes 0.0      Absolute NRBCs 0.0       Abdomen US, limited (RUQ only)   Preliminary Result   IMPRESSION:   1.  Findings are suspicious for acute cholecystitis.   2.  No biliary dilatation.      CT Abdomen Pelvis w Contrast   Final Result   IMPRESSION:    1.  The gallbladder wall is diffusely thickened and enhancing. Acute   cholecystitis could have this appearance. Gallbladder ultrasound would   be recommended for further evaluation.   2.  Small amount of nonspecific free fluid in the pelvis.      AIDAN MOTA MD            SYSTEM ID:  [id number]          Patient's response to treatments and/or interventions: monitor     To be done/followed up on inpatient unit:  monitor     Does this patient have any cognitive concerns?: NA    Activity level - Baseline/Home:  Independent  Activity Level - Current:   Independent    Patient's Preferred language: English   Needed?: No    Isolation: None  Infection: Not Applicable  Patient tested for COVID 19 prior to admission: YES  Bariatric?: No    Vital Signs:   Vitals:    12/14/22 0916   BP: 119/66   Pulse: 100   Resp: 16   Temp: 98.5  F (36.9  C)   TempSrc: Oral   SpO2: 100%   Weight: 68 kg (150 lb)   Height: 1.803 m (5' 11\")       Cardiac Rhythm:Cardiac Rhythm: Normal sinus rhythm    Was the PSS-3 completed:   Yes  What interventions are required if any?            For the majority of the shift this patient's behavior was Green.   Behavioral interventions performed were NA.    ED NURSE PHONE NUMBER: *[id number]         "

## 2022-12-14 NOTE — CONSULTS
General Surgery emergency room consultation/H&P    Skyla Powers MRN#: [id number]   Age: 32 year old YOB: 1990     Date of Admission:          12/14/2022  Reason for consult/H&P:  Gallbladder disease   Surgeon:      John Shaffer MD                  Chief Complaint:   Abdominal pain, epigastric         History of Present Illness:   This patient is a 32 year old  male who presented to the [facility] ER with abdominal pain for the last 3 days.  He felt that he ate some bad food on Sunday and thought the discomfort was due to that but it persisted.  It is now mainly in his upper abdomen a bit more to the right than on the left.  He has not had similar problems before and has never been jaundiced.  He has had no trauma to the abdomen.. Denies fever, chills, , vomiting, change in BM or urination.   Denies having any previous  abdominal surgery. History is obtained from the patient and chart.         Past Medical History:    has no past medical history on file.          Past Surgical History:   No past surgical history on file.         Medications:     Prior to Admission medications    Medication Sig Start Date End Date Taking? Authorizing Provider   acetaminophen (TYLENOL) 325 MG tablet Take 325-650 mg by mouth every 6 hours as needed for mild pain   Yes Unknown, Entered By History   multivitamin w/minerals (MULTI-VITAMIN) tablet Take 1 tablet by mouth daily   Yes Unknown, Entered By History   omeprazole (PRILOSEC) 20 MG DR capsule Take 20 mg by mouth every 8 hours as needed   Yes Unknown, Entered By History   ondansetron (ZOFRAN ODT) 4 MG ODT tab Take 1 tablet (4 mg) by mouth every 8 hours as needed for nausea 12/12/22 12/15/22 Yes Juan Carlos Dewitt MD   famotidine (PEPCID) 20 MG tablet Take 1 tablet (20 mg) by mouth 2 times daily 12/12/22   Juan Carlos Dewitt MD            Allergies:     Allergies   Allergen Reactions     Pcn [Penicillins]             Social History:     Social History  "    Tobacco Use     Smoking status: Never     Smokeless tobacco: Not on file   Substance Use Topics     Alcohol use: No             Family History:    This patient has no significant relevant family history.  Family history is reviewed in detail.          Review of Systems:   Complete ROS is negative other than noted in the HPI.  C: NEGATIVE for fever, chills, change in weight  R: NEGATIVE for significant cough or SOB  CV: NEGATIVE for chest pain, palpitations or peripheral edema  GI:  NEGATIVE for dysuria, heartburn, or change in bowel habits  H: NEGATIVE for bleeding problems         Physical Exam:   Blood pressure 119/80, pulse 82, temperature 98.5  F (36.9  C), temperature source Oral, resp. rate 16, height 1.803 m (5' 11\"), weight 68 kg (150 lb), SpO2 98 %.  No intake/output data recorded.    General - This is a well developed, well nourished male .  HEENT - Normocephalic. Atraumatic. Moist mucous membranes. Pupils equal.  No scleral icterus. Nose normal.  Neck - Supple without masses. No cervical adenopathy or thyromegaly  Lungs - Breathing not labored  Chest - Not tender. CVA's nontender  Heart - Regular rate & rhythm   Abdomen - Soft, nontender except for some right upper quadrant discomfort with firm pressure., nondistended with +bowel sounds, no organomegaly.  Extremities - Moves all extremities. Warm without edema. Pulses noted  Neurologic - Nonfocal. Alert and oriented          Data:   Labs:  Recent Labs   Lab Test 12/14/22 0938 12/12/22  0501   WBC 9.2 5.6   HGB 14.6 15.0   HCT 43.6 46.4    227     Recent Labs   Lab Test 12/14/22 0938 12/12/22  0501   POTASSIUM 3.7 3.8   CHLORIDE 102 102   CO2 31 29   BUN 7 12.8   CR 0.89 0.96     Recent Labs   Lab Test 12/14/22 0938 12/12/22  0501   BILITOTAL 0.7 0.2   ALT 26 21   AST 16 23   ALKPHOS 102 117   LIPASE 55* 31     No lab results found.  Recent Labs   Lab Test 12/14/22 0938 12/12/22  0501   LOWELL 8.8 9.4     Recent Labs   Lab Test 12/14/22 0938 " 12/12/22  0618 12/12/22  0501   ANIONGAP 6  --  10   PROTEIN 30* 50*  --    ALBUMIN 3.3*  --  4.4       CT scan of the abdomen: Images reviewed in detail.  Thick-walled gallbladder.  I see nothing else worrisome.    Ultrasound of the abdomen: Images reviewed.  Lots of gallstones and thick-walled gallbladder.               Assessment:   Symptomatic gallstones with acute cholecystitis.  We discussed the options of observation with or without antibiotic therapy.  We also reviewed the benefits of cholecystectomy.  He has a fairly strong family history of gallbladder disease with his mother having had her gallbladder removed.  After reviewing the choices he is chosen to proceed with cholecystectomy in the next day or so.  There is no operating room available today so we will do this tomorrow.         Plan:   Laparoscopic cholecystectomy on 12/15/2022.  Risks reviewed in detail with the patient and his father.       I have discussed the history, physical, and plan with the patient.   John Shaffer M.D.

## 2022-12-14 NOTE — ED TRIAGE NOTES
Pt reports R to mid abdominal pain since Sunday night, reports earlier in the week had some n/v/d.  Pt reports pain is worst when he is up and walking around. Pt denies surgeries to abd      Triage Assessment     Row Name 12/14/22 0922       Triage Assessment (Adult)    Airway WDL WDL       Respiratory WDL    Respiratory WDL WDL       Skin Circulation/Temperature WDL    Skin Circulation/Temperature WDL WDL       Cardiac WDL    Cardiac WDL WDL       Peripheral/Neurovascular WDL    Peripheral Neurovascular WDL WDL       Cognitive/Neuro/Behavioral WDL    Cognitive/Neuro/Behavioral WDL WDL

## 2022-12-14 NOTE — PHARMACY-ADMISSION MEDICATION HISTORY
Pharmacy Medication History  Admission medication history interview status for the 12/14/2022  admission is complete. See EPIC admission navigator for prior to admission medications     Location of Interview: Patient room  Medication history sources: Patient and Surescripts    Significant changes made to the medication list:  Added Prilosec, Tylenol, multivitamin   Removed ibuprofen 600 mg q6h PRN, Percocet 5-325 mg 1-2 tabs q6h PRN (both Rxs from 2014, patient confirmed not taking)     In the past week, patient estimated taking medication this percent of the time: n/a, not taking routine medications    Additional medication history information:   Patient is a reliable historian. Patient was prescribed famotidine on 12/12/22, but had home supply of omeprazole he was taking instead.     Medication reconciliation completed by provider prior to medication history? No    Time spent in this activity: 10 minutes    Medication Sig Last Dose Taking? Auth Provider   acetaminophen (TYLENOL) 325 MG tablet Take 325-650 mg by mouth every 6 hours as needed for mild pain 12/13/2022 at PM Yes Unknown, Entered By History   multivitamin w/minerals (MULTI-VITAMIN) tablet Take 1 tablet by mouth daily Past Week at does not take every day Yes Unknown, Entered By History   omeprazole (PRILOSEC) 20 MG DR capsule Take 20 mg by mouth every 8 hours as needed 12/13/2022 at PM Yes Unknown, Entered By History   ondansetron (ZOFRAN ODT) 4 MG ODT tab Take 1 tablet (4 mg) by mouth every 8 hours as needed for nausea 12/13/2022 at 2300 Yes Juan Carlos Dewitt MD   famotidine (PEPCID) 20 MG tablet Take 1 tablet (20 mg) by mouth 2 times daily  at not yet picked up/started  Juan Carlos Dewitt MD       The information provided in this note is only as accurate as the sources available at the time of update(s)   Valery Quezada PharmD

## 2022-12-14 NOTE — DISCHARGE INSTRUCTIONS
As discussed with Dr. Shaffer, return to the hospital tomorrow morning for surgery.  Eat a low fat diet tonight.  Nothing to eat or drink after midnight.  Return to the ER for any worsening of condition.

## 2022-12-14 NOTE — LETTER
Skyla Powers was seen and treated in our emergency department on 12/14/2022.  He may return to work on 12/19/2022.  Mr. Field was seen at our hospital for a medical problem and will require surgery.  He will need to be out of work the rest of the week.  He likely will be able to return Monday, though he may be limited in his lifting capabilities.  He may return to full activity when he feels well enough to perform his work duties.     If you have any questions or concerns, please don't hesitate to call.      Trierweiler, Chad A, MD

## 2022-12-15 ENCOUNTER — HOSPITAL ENCOUNTER (OUTPATIENT)
Facility: CLINIC | Age: 32
Discharge: HOME | End: 2022-12-15
Attending: SURGERY | Admitting: SURGERY
Payer: COMMERCIAL

## 2022-12-15 ENCOUNTER — APPOINTMENT (OUTPATIENT)
Dept: SURGERY | Facility: PHYSICIAN GROUP | Age: 32
End: 2022-12-15
Payer: COMMERCIAL

## 2022-12-15 ENCOUNTER — ANESTHESIA (OUTPATIENT)
Dept: SURGERY | Facility: CLINIC | Age: 32
End: 2022-12-15
Payer: COMMERCIAL

## 2022-12-15 VITALS
RESPIRATION RATE: 16 BRPM | HEART RATE: 88 BPM | DIASTOLIC BLOOD PRESSURE: 79 MMHG | SYSTOLIC BLOOD PRESSURE: 121 MMHG | BODY MASS INDEX: 21.69 KG/M2 | HEIGHT: 70 IN | WEIGHT: 151.5 LBS | TEMPERATURE: 98.3 F | OXYGEN SATURATION: 98 %

## 2022-12-15 DIAGNOSIS — K80.00 ACUTE CALCULOUS CHOLECYSTITIS: Primary | ICD-10-CM

## 2022-12-15 PROCEDURE — 47562 LAPAROSCOPIC CHOLECYSTECTOMY: CPT | Performed by: SURGERY

## 2022-12-15 PROCEDURE — 250N000009 HC RX 250: Performed by: SURGERY

## 2022-12-15 PROCEDURE — 272N000001 HC OR GENERAL SUPPLY STERILE: Performed by: SURGERY

## 2022-12-15 PROCEDURE — 250N000025 HC SEVOFLURANE, PER MIN: Performed by: SURGERY

## 2022-12-15 PROCEDURE — 370N000017 HC ANESTHESIA TECHNICAL FEE, PER MIN: Performed by: SURGERY

## 2022-12-15 PROCEDURE — 250N000009 HC RX 250: Performed by: NURSE ANESTHETIST, CERTIFIED REGISTERED

## 2022-12-15 PROCEDURE — 250N000013 HC RX MED GY IP 250 OP 250 PS 637: Performed by: PHYSICIAN ASSISTANT

## 2022-12-15 PROCEDURE — 88304 TISSUE EXAM BY PATHOLOGIST: CPT | Mod: TC | Performed by: SURGERY

## 2022-12-15 PROCEDURE — 250N000011 HC RX IP 250 OP 636: Performed by: SURGERY

## 2022-12-15 PROCEDURE — 710N000012 HC RECOVERY PHASE 2, PER MINUTE: Performed by: SURGERY

## 2022-12-15 PROCEDURE — 999N000141 HC STATISTIC PRE-PROCEDURE NURSING ASSESSMENT: Performed by: SURGERY

## 2022-12-15 PROCEDURE — 250N000011 HC RX IP 250 OP 636: Performed by: NURSE ANESTHETIST, CERTIFIED REGISTERED

## 2022-12-15 PROCEDURE — 258N000001 HC RX 258: Performed by: SURGERY

## 2022-12-15 PROCEDURE — 360N000076 HC SURGERY LEVEL 3, PER MIN: Performed by: SURGERY

## 2022-12-15 PROCEDURE — 47562 LAPAROSCOPIC CHOLECYSTECTOMY: CPT | Mod: AS | Performed by: PHYSICIAN ASSISTANT

## 2022-12-15 PROCEDURE — 258N000003 HC RX IP 258 OP 636: Performed by: NURSE ANESTHETIST, CERTIFIED REGISTERED

## 2022-12-15 PROCEDURE — 710N000009 HC RECOVERY PHASE 1, LEVEL 1, PER MIN: Performed by: SURGERY

## 2022-12-15 RX ORDER — HYDROCODONE BITARTRATE AND ACETAMINOPHEN 5; 325 MG/1; MG/1
1-2 TABLET ORAL EVERY 4 HOURS PRN
Qty: 15 TABLET | Refills: 0 | Status: SHIPPED | OUTPATIENT
Start: 2022-12-15

## 2022-12-15 RX ORDER — ONDANSETRON 2 MG/ML
4 INJECTION INTRAMUSCULAR; INTRAVENOUS EVERY 30 MIN PRN
Status: DISCONTINUED | OUTPATIENT
Start: 2022-12-15 | End: 2022-12-15 | Stop reason: HOSPADM

## 2022-12-15 RX ORDER — HYDROMORPHONE HCL IN WATER/PF 6 MG/30 ML
0.4 PATIENT CONTROLLED ANALGESIA SYRINGE INTRAVENOUS EVERY 5 MIN PRN
Status: DISCONTINUED | OUTPATIENT
Start: 2022-12-15 | End: 2022-12-15 | Stop reason: HOSPADM

## 2022-12-15 RX ORDER — CEFAZOLIN SODIUM/WATER 2 G/20 ML
2 SYRINGE (ML) INTRAVENOUS
Status: DISCONTINUED | OUTPATIENT
Start: 2022-12-15 | End: 2022-12-15

## 2022-12-15 RX ORDER — BUPIVACAINE HYDROCHLORIDE AND EPINEPHRINE 2.5; 5 MG/ML; UG/ML
INJECTION, SOLUTION INFILTRATION; PERINEURAL PRN
Status: DISCONTINUED | OUTPATIENT
Start: 2022-12-15 | End: 2022-12-15 | Stop reason: HOSPADM

## 2022-12-15 RX ORDER — ONDANSETRON 4 MG/1
4 TABLET, ORALLY DISINTEGRATING ORAL EVERY 30 MIN PRN
Status: DISCONTINUED | OUTPATIENT
Start: 2022-12-15 | End: 2022-12-15 | Stop reason: HOSPADM

## 2022-12-15 RX ORDER — LABETALOL HYDROCHLORIDE 5 MG/ML
10 INJECTION, SOLUTION INTRAVENOUS
Status: DISCONTINUED | OUTPATIENT
Start: 2022-12-15 | End: 2022-12-15 | Stop reason: HOSPADM

## 2022-12-15 RX ORDER — SODIUM CHLORIDE, SODIUM LACTATE, POTASSIUM CHLORIDE, CALCIUM CHLORIDE 600; 310; 30; 20 MG/100ML; MG/100ML; MG/100ML; MG/100ML
INJECTION, SOLUTION INTRAVENOUS CONTINUOUS
Status: DISCONTINUED | OUTPATIENT
Start: 2022-12-15 | End: 2022-12-15 | Stop reason: HOSPADM

## 2022-12-15 RX ORDER — GLYCOPYRROLATE 0.2 MG/ML
INJECTION, SOLUTION INTRAMUSCULAR; INTRAVENOUS PRN
Status: DISCONTINUED | OUTPATIENT
Start: 2022-12-15 | End: 2022-12-15

## 2022-12-15 RX ORDER — SODIUM CHLORIDE, SODIUM LACTATE, POTASSIUM CHLORIDE, CALCIUM CHLORIDE 600; 310; 30; 20 MG/100ML; MG/100ML; MG/100ML; MG/100ML
INJECTION, SOLUTION INTRAVENOUS CONTINUOUS PRN
Status: DISCONTINUED | OUTPATIENT
Start: 2022-12-15 | End: 2022-12-15

## 2022-12-15 RX ORDER — FENTANYL CITRATE 50 UG/ML
INJECTION, SOLUTION INTRAMUSCULAR; INTRAVENOUS PRN
Status: DISCONTINUED | OUTPATIENT
Start: 2022-12-15 | End: 2022-12-15

## 2022-12-15 RX ORDER — HYDROCODONE BITARTRATE AND ACETAMINOPHEN 5; 325 MG/1; MG/1
1 TABLET ORAL
Status: COMPLETED | OUTPATIENT
Start: 2022-12-15 | End: 2022-12-15

## 2022-12-15 RX ORDER — DEXAMETHASONE SODIUM PHOSPHATE 4 MG/ML
INJECTION, SOLUTION INTRA-ARTICULAR; INTRALESIONAL; INTRAMUSCULAR; INTRAVENOUS; SOFT TISSUE PRN
Status: DISCONTINUED | OUTPATIENT
Start: 2022-12-15 | End: 2022-12-15

## 2022-12-15 RX ORDER — NEOSTIGMINE METHYLSULFATE 1 MG/ML
VIAL (ML) INJECTION PRN
Status: DISCONTINUED | OUTPATIENT
Start: 2022-12-15 | End: 2022-12-15

## 2022-12-15 RX ORDER — HYDROMORPHONE HCL IN WATER/PF 6 MG/30 ML
0.2 PATIENT CONTROLLED ANALGESIA SYRINGE INTRAVENOUS EVERY 5 MIN PRN
Status: DISCONTINUED | OUTPATIENT
Start: 2022-12-15 | End: 2022-12-15 | Stop reason: HOSPADM

## 2022-12-15 RX ORDER — CEFAZOLIN SODIUM/WATER 2 G/20 ML
2 SYRINGE (ML) INTRAVENOUS SEE ADMIN INSTRUCTIONS
Status: DISCONTINUED | OUTPATIENT
Start: 2022-12-15 | End: 2022-12-15

## 2022-12-15 RX ORDER — PROPOFOL 10 MG/ML
INJECTION, EMULSION INTRAVENOUS CONTINUOUS PRN
Status: DISCONTINUED | OUTPATIENT
Start: 2022-12-15 | End: 2022-12-15

## 2022-12-15 RX ORDER — CLINDAMYCIN PHOSPHATE 900 MG/50ML
900 INJECTION, SOLUTION INTRAVENOUS ONCE
Status: COMPLETED | OUTPATIENT
Start: 2022-12-15 | End: 2022-12-15

## 2022-12-15 RX ORDER — ONDANSETRON 2 MG/ML
INJECTION INTRAMUSCULAR; INTRAVENOUS PRN
Status: DISCONTINUED | OUTPATIENT
Start: 2022-12-15 | End: 2022-12-15

## 2022-12-15 RX ORDER — FENTANYL CITRATE 50 UG/ML
25 INJECTION, SOLUTION INTRAMUSCULAR; INTRAVENOUS EVERY 5 MIN PRN
Status: DISCONTINUED | OUTPATIENT
Start: 2022-12-15 | End: 2022-12-15 | Stop reason: HOSPADM

## 2022-12-15 RX ORDER — HYDROMORPHONE HYDROCHLORIDE 1 MG/ML
INJECTION, SOLUTION INTRAMUSCULAR; INTRAVENOUS; SUBCUTANEOUS PRN
Status: DISCONTINUED | OUTPATIENT
Start: 2022-12-15 | End: 2022-12-15

## 2022-12-15 RX ORDER — LIDOCAINE HYDROCHLORIDE 20 MG/ML
INJECTION, SOLUTION INFILTRATION; PERINEURAL PRN
Status: DISCONTINUED | OUTPATIENT
Start: 2022-12-15 | End: 2022-12-15

## 2022-12-15 RX ORDER — PROPOFOL 10 MG/ML
INJECTION, EMULSION INTRAVENOUS PRN
Status: DISCONTINUED | OUTPATIENT
Start: 2022-12-15 | End: 2022-12-15

## 2022-12-15 RX ORDER — MAGNESIUM HYDROXIDE 1200 MG/15ML
LIQUID ORAL PRN
Status: DISCONTINUED | OUTPATIENT
Start: 2022-12-15 | End: 2022-12-15 | Stop reason: HOSPADM

## 2022-12-15 RX ORDER — FENTANYL CITRATE 50 UG/ML
50 INJECTION, SOLUTION INTRAMUSCULAR; INTRAVENOUS EVERY 5 MIN PRN
Status: DISCONTINUED | OUTPATIENT
Start: 2022-12-15 | End: 2022-12-15 | Stop reason: HOSPADM

## 2022-12-15 RX ADMIN — PROPOFOL 200 MG: 10 INJECTION, EMULSION INTRAVENOUS at 09:39

## 2022-12-15 RX ADMIN — ROCURONIUM BROMIDE 50 MG: 50 INJECTION, SOLUTION INTRAVENOUS at 09:39

## 2022-12-15 RX ADMIN — MIDAZOLAM 2 MG: 1 INJECTION INTRAMUSCULAR; INTRAVENOUS at 09:34

## 2022-12-15 RX ADMIN — NEOSTIGMINE METHYLSULFATE 3.5 MG: 1 INJECTION, SOLUTION INTRAVENOUS at 11:00

## 2022-12-15 RX ADMIN — LIDOCAINE HYDROCHLORIDE 80 MG: 20 INJECTION, SOLUTION INFILTRATION; PERINEURAL at 09:39

## 2022-12-15 RX ADMIN — ONDANSETRON 4 MG: 2 INJECTION INTRAMUSCULAR; INTRAVENOUS at 10:53

## 2022-12-15 RX ADMIN — PROPOFOL 50 MCG/KG/MIN: 10 INJECTION, EMULSION INTRAVENOUS at 09:49

## 2022-12-15 RX ADMIN — HYDROMORPHONE HYDROCHLORIDE 0.5 MG: 1 INJECTION, SOLUTION INTRAMUSCULAR; INTRAVENOUS; SUBCUTANEOUS at 10:38

## 2022-12-15 RX ADMIN — FENTANYL CITRATE 50 MCG: 50 INJECTION, SOLUTION INTRAMUSCULAR; INTRAVENOUS at 09:34

## 2022-12-15 RX ADMIN — HYDROCODONE BITARTRATE AND ACETAMINOPHEN 1 TABLET: 5; 325 TABLET ORAL at 12:15

## 2022-12-15 RX ADMIN — DEXAMETHASONE SODIUM PHOSPHATE 4 MG: 4 INJECTION, SOLUTION INTRA-ARTICULAR; INTRALESIONAL; INTRAMUSCULAR; INTRAVENOUS; SOFT TISSUE at 09:49

## 2022-12-15 RX ADMIN — FENTANYL CITRATE 50 MCG: 50 INJECTION, SOLUTION INTRAMUSCULAR; INTRAVENOUS at 10:00

## 2022-12-15 RX ADMIN — CLINDAMYCIN PHOSPHATE 900 MG: 900 INJECTION, SOLUTION INTRAVENOUS at 09:34

## 2022-12-15 RX ADMIN — ROCURONIUM BROMIDE 10 MG: 50 INJECTION, SOLUTION INTRAVENOUS at 10:00

## 2022-12-15 RX ADMIN — SODIUM CHLORIDE, POTASSIUM CHLORIDE, SODIUM LACTATE AND CALCIUM CHLORIDE: 600; 310; 30; 20 INJECTION, SOLUTION INTRAVENOUS at 09:34

## 2022-12-15 RX ADMIN — GLYCOPYRROLATE 0.5 MG: 0.2 INJECTION, SOLUTION INTRAMUSCULAR; INTRAVENOUS at 11:00

## 2022-12-15 ASSESSMENT — ENCOUNTER SYMPTOMS
DYSRHYTHMIAS: 0
SEIZURES: 0

## 2022-12-15 ASSESSMENT — LIFESTYLE VARIABLES: TOBACCO_USE: 0

## 2022-12-15 ASSESSMENT — ACTIVITIES OF DAILY LIVING (ADL)
ADLS_ACUITY_SCORE: 35

## 2022-12-15 NOTE — ANESTHESIA PROCEDURE NOTES
Airway       Patient location during procedure: OR       Procedure Start/Stop Times: 12/15/2022 9:41 AM  Staff -        CRNA: Karina Collins APRN CRNA       Performed By: CRNA  Consent for Airway        Urgency: elective  Indications and Patient Condition       Indications for airway management: daja-procedural       Induction type:intravenous       Mask difficulty assessment: 2 - vent by mask + OA or adjuvant +/- NMBA    Final Airway Details       Final airway type: endotracheal airway       Successful airway: ETT - single  Endotracheal Airway Details        ETT size (mm): 8.0       Cuffed: yes       Successful intubation technique: direct laryngoscopy       DL Blade Type: Maxwell 2       Grade View of Cords: 1       Adjucts: stylet       Position: Right       Measured from: gums/teeth       Secured at (cm): 22       Bite block used: None    Post intubation assessment        Placement verified by: capnometry, equal breath sounds and chest rise        Number of attempts at approach: 1       Secured with: pink tape       Ease of procedure: easy       Dentition: Unchanged and Intact    Medication(s) Administered   Medication Administration Time: 12/15/2022 9:41 AM

## 2022-12-15 NOTE — DISCHARGE INSTRUCTIONS
[facility]  Discharge Instructions: Post-Operative Cholecystectomy    ACTIVITY  Expect to feel tired after your surgery.  This will gradually resolve.    Take frequent, short walks and increase your activity gradually.    Avoid strenuous physical activity or heavy lifting greater than 15-20 lbs. for 2-3 weeks.  You may climb stairs.  You may drive without restrictions when you are not using any prescription pain medication and feel comfortable in a car.  You may return to work/school when you are comfortable without any prescription pain medication.    WOUND CARE  You may remove your outer dressing or Band-Aids and shower 48 hours after the surgery.  Pat your incisions dry and leave them open to air.  Re-apply dressing (Band-Aids or gauze/tape) as needed for comfort or drainage.  You may have steri-strips (looks like white tape) on your incision.  You may peel off the steri-strips 2 weeks after your surgery if they have not peeled off on their own.  If you have skin glue, it will peel up and fall off on its own.  Do not soak your incisions in a tub or pool for 2 weeks.   Do not apply any lotions, creams, or ointments to your incisions.  A ridge under your incisions is normal and will gradually resolve.    DIET  Start with liquids, then gradually resume your regular diet as tolerated.  Avoid heavy, spicy, and greasy meals for 2-3 days.  Drink plenty of fluids to stay hydrated.  It is not uncommon to experience some loose stools or diarrhea after surgery.  This is your body's way of adapting to the bile which will slowly drain into your intestine.  A low fat diet may help with this.  This should improve over 1-2 months.    PAIN  Expect some tenderness and discomfort at the incision sites.  Use the prescribed pain medication at your discretion.  Expect gradual resolution of your pain over several days.  You may take ibuprofen with food (unless you have been told not to) or  acetaminophen/Tylenol instead of or in addition to your prescribed pain medication.  However, if you are taking Norco or Percocet, do not take any additional acetaminophen/Tylenol.  Do not drink alcohol or drive while you are taking pain medications.  You may apply ice to your incisions in 20 minute intervals as needed for the next 48 hours.  After that time, consider switching to heat if you prefer.    EXPECTATIONS  Pain medications can cause constipation.  Limit use when possible.  Take an over the counter or prescribed stool softener/stimulant, such as Colace or Senna, 1-2 times a day with plenty of water.  You may take a mild over the counter laxative, such as Miralax or a suppository, as needed.  You may discontinue these medications once you are having regular bowel movements and/or are no longer taking your narcotic pain medication.    You may have shoulder or upper back discomfort due to the gas used in surgery.  This is temporary and should resolve in 48-72 hours.  Short, frequent walks may help with this.  If you are unable to urinate for 8 hours or feel as though you are not emptying your bladder adequately, we recommend you seek care at an ER or Urgent Care facility for possible catheter placement.     FOLLOW UP  Our office will contact you in approximately 2-3 weeks to check on your progress and answer any questions you may have.  If you are doing well, you will not need to return for a follow up appointment.  If any concerns are identified over the phone, we will help you make an appointment to see a provider.   If you have not received a phone call, have any questions or concerns, or would like to be seen, please call us at [phone] and ask to speak with our nurse.  We are located at [address] [place] [address].    CALL OUR OFFICE -[phone] IF YOU HAVE:   Chills or fever above 101 F.  Increased redness, warmth, or drainage at your incisions.  Significant  bleeding.  Pain not relieved by your pain medication or rest.  Increasing pain after the first 48 hours.  Any other concerns or questions.                        Revised October 2022

## 2022-12-15 NOTE — INTERVAL H&P NOTE
I have reviewed the surgical (or preoperative) H&P that is linked to this encounter, and examined the patient. There are no significant changes    Clinical Conditions Present on Arrival:  Clinically Significant Risk Factors Present on Admission                # Hypoalbuminemia: Lowest albumin = 3.3 g/dL at 12/14/2022  9:38 AM, will monitor as appropriate

## 2022-12-15 NOTE — ANESTHESIA PREPROCEDURE EVALUATION
Anesthesia Pre-Procedure Evaluation    Patient: Skyla Powers   MRN: [id number] : 1990        Procedure : Procedure(s):  LAPAROSCOPIC CHOLECYSTECTOMY          No past medical history on file.   No past surgical history on file.   Allergies   Allergen Reactions     Pcn [Penicillins]       Social History     Tobacco Use     Smoking status: Never     Smokeless tobacco: Not on file   Substance Use Topics     Alcohol use: No      Wt Readings from Last 1 Encounters:   22 68 kg (150 lb)        Anesthesia Evaluation   Pt has not had prior anesthetic         ROS/MED HX  ENT/Pulmonary:    (-) tobacco use, asthma and sleep apnea   Neurologic:    (-) no seizures, no CVA and migraines   Cardiovascular:    (-) hypertension, CHF, arrhythmias, irregular heartbeat/palpitations and dyslipidemia   METS/Exercise Tolerance:     Hematologic:       Musculoskeletal:       GI/Hepatic: Comment: Symptomatic gallstones with acute cholecystitis    (+) cholecystitis/cholelithiasis,  (-) GERD and liver disease   Renal/Genitourinary:    (-) renal disease   Endo:    (-) Type II DM and thyroid disease   Psychiatric/Substance Use:    (-) alcohol abuse history   Infectious Disease:       Malignancy:       Other:            Physical Exam    Airway        Mallampati: I   TM distance: > 3 FB   Neck ROM: full   Mouth opening: > 3 cm    Respiratory Devices and Support         Dental  no notable dental history         Cardiovascular   cardiovascular exam normal          Pulmonary   pulmonary exam normal        breath sounds clear to auscultation           OUTSIDE LABS:  CBC:   Lab Results   Component Value Date    WBC 9.2 2022    WBC 5.6 2022    HGB 14.6 2022    HGB 15.0 2022    HCT 43.6 2022    HCT 46.4 2022     2022     2022     BMP:   Lab Results   Component Value Date     2022     2022    POTASSIUM 3.7 2022    POTASSIUM 3.8 2022     CHLORIDE 102 12/14/2022    CHLORIDE 102 12/12/2022    CO2 31 12/14/2022    CO2 29 12/12/2022    BUN 7 12/14/2022    BUN 12.8 12/12/2022    CR 0.89 12/14/2022    CR 0.96 12/12/2022     (H) 12/14/2022     (H) 12/12/2022     COAGS: No results found for: PTT, INR, FIBR  POC: No results found for: BGM, HCG, HCGS  HEPATIC:   Lab Results   Component Value Date    ALBUMIN 3.3 (L) 12/14/2022    PROTTOTAL 7.6 12/14/2022    ALT 26 12/14/2022    AST 16 12/14/2022    ALKPHOS 102 12/14/2022    BILITOTAL 0.7 12/14/2022     OTHER:   Lab Results   Component Value Date    LOWELL 8.8 12/14/2022    LIPASE 55 (L) 12/14/2022       Anesthesia Plan    ASA Status:  2   NPO Status:  NPO Appropriate    Anesthesia Type: General.     - Airway: ETT   Induction: Intravenous.   Maintenance: Balanced.        Consents    Anesthesia Plan(s) and associated risks, benefits, and realistic alternatives discussed. Questions answered and patient/representative(s) expressed understanding.    - Discussed:     - Discussed with:  Patient      - Extended Intubation/Ventilatory Support Discussed: No.      - Patient is DNR/DNI Status: No    Use of blood products discussed: Yes.     - Discussed with: Patient.     - Consented: consented to blood products            Reason for refusal: other.     Postoperative Care    Pain management: IV analgesics, Oral pain medications, Multi-modal analgesia.   PONV prophylaxis: Ondansetron (or other 5HT-3), Dexamethasone or Solumedrol, Background Propofol Infusion     Comments:                Saulo Lyles DO

## 2022-12-15 NOTE — OP NOTE
General Surgery Operative Note    PREOPERATIVE DIAGNOSIS:  Acute cholecystitis [K81.0]    POSTOPERATIVE DIAGNOSIS:  Same    PROCEDURE:   Procedure(s):  LAPAROSCOPIC CHOLECYSTECTOMY    ANESTHESIA:  General.      SURGEON:  John Shaffer MD    ASSISTANT:  Gal Retana PA-C. The physician assistant was medically necessary for their expertise in camera management, suctioning, and retraction    INDICATIONS:  The patient has relentless RUQ pain and gallstones. Cholecystectomy was recommended.  The risks, including but not limited to bleeding, infection, bile duct or bowel injury, anesthesia, and the possible need for an open approach were reviewed. The patient appeared to understand and wished to proceed with operation.    PROCEDURE:  The patient was taken to the operating suite.  The operative area was prepped and draped in a sterile fashion.  Surgeon initiated timeout was acknowledged.      Under general anesthesia the abdomen was insufflated through a periumbilical incision with a veress needle. Over 3 liters were place with low pressures. A 5mm trocar was placed. There was no injury seen when the camera was placed. Under direct vision a 5mm trocar was placed in the right upper quadrant and an 11mm trocar placed below the xiphoid. The gallbladder was grasped and adhesions taken down with blunt dissection and cautery. The peritoneal surfaces of the critical angle were cauterized posteriorly and anteriorly. The critical angle was dissected out, until there were only two structures remaining. Once these structures were identified, the duct was doubly clipped proximally, singly clipped distally and divided. The cystic artery was double clipped proximally, singly clipped distally and divided. The gallbladder was dissected off its bed with cautery from medial to lateral. There was an artery running along the gallbladder. This went up into the gallbladder about 40% or the way up. This was doubly clipped proximally and  divided. Further dissection from the top down was done. Finally the gallbladder was freed.  The gallbladder was set aside and hemostasis assured on the liver. Irrigation was used and suctioned out. The bed was dry and the clips were intact. The gallbladder was removed through the larger incision, which was enlarged quite a bit to permit passage of the gallbladder. We then irrigated again, and saw no sign of blood of bile leak. We checked for veress needle injury, there was none. The large trocar was removed and the fascia closed with 0 Vicryl. Marcaine was instilled. Gas was suctioned out. Trocars were removed. Sponge count was reported as correct. All incisions were closed with 4-0 Vicryl and steri-strips.            INTRAOPERATIVE FINDINGS:  Many stones in a large, inflamed gallbladder    John Shaffer MD

## 2022-12-15 NOTE — BRIEF OP NOTE
[facility]    Brief Operative Note    Pre-operative diagnosis: Acute cholecystitis [K81.0]  Post-operative diagnosis Acute Cholecystitis with Cholelithiasis    Procedure: Procedure(s):  LAPAROSCOPIC CHOLECYSTECTOMY     Surgeon: Surgeon(s) and Role:     * John Shaffer MD - Primary     * Gal Retana PA-C - Assisting     Anesthesia: General   Estimated Blood Loss: Less than 10 ml    Drains: None  Specimens:   ID Type Source Tests Collected by Time Destination   1 : GALLBLADDER SAC WITH CONTENTS Tissue Gallbladder SURGICAL PATHOLOGY EXAM John Shaffer MD 12/15/2022 11:00 AM      Findings:   None.  Complications: None.  Implants: * No implants in log *

## 2022-12-15 NOTE — ANESTHESIA CARE TRANSFER NOTE
Patient: Skyla Powers    Procedure: Procedure(s):  LAPAROSCOPIC CHOLECYSTECTOMY       Diagnosis: Acute cholecystitis [K81.0]  Diagnosis Additional Information: No value filed.    Anesthesia Type:   General     Note:    Oropharynx: oropharynx clear of all foreign objects  Level of Consciousness: awake  Oxygen Supplementation: face mask  Level of Supplemental Oxygen (L/min / FiO2): 6  Independent Airway: airway patency satisfactory and stable  Dentition: dentition unchanged      Patient transferred to: PACU    Handoff Report: Identifed the Patient, Identified the Reponsible Provider, Reviewed the pertinent medical history, Discussed the surgical course, Reviewed Intra-OP anesthesia mangement and issues during anesthesia, Set expectations for post-procedure period and Allowed opportunity for questions and acknowledgement of understanding      Vitals:  Vitals Value Taken Time   BP     Temp     Pulse     Resp     SpO2         Electronically Signed By: KONRAD Calderón CRNA  December 15, 2022  11:24 AM

## 2022-12-16 LAB
PATH REPORT.COMMENTS IMP SPEC: NORMAL
PATH REPORT.COMMENTS IMP SPEC: NORMAL
PATH REPORT.FINAL DX SPEC: NORMAL
PATH REPORT.GROSS SPEC: NORMAL
PATH REPORT.MICROSCOPIC SPEC OTHER STN: NORMAL
PATH REPORT.RELEVANT HX SPEC: NORMAL
PHOTO IMAGE: NORMAL

## 2022-12-16 PROCEDURE — 88304 TISSUE EXAM BY PATHOLOGIST: CPT | Mod: 26 | Performed by: PATHOLOGY

## 2022-12-16 NOTE — ANESTHESIA POSTPROCEDURE EVALUATION
Patient: Skyla Powers    Procedure: Procedure(s):  LAPAROSCOPIC CHOLECYSTECTOMY       Anesthesia Type:  General    Note:  Disposition: Outpatient   Postop Pain Control: Uneventful            Sign Out: Well controlled pain   PONV: No   Neuro/Psych: Uneventful            Sign Out: Acceptable/Baseline neuro status   Airway/Respiratory: Uneventful            Sign Out: Acceptable/Baseline resp. status   CV/Hemodynamics: Uneventful            Sign Out: Acceptable CV status; No obvious hypovolemia; No obvious fluid overload   Other NRE: NONE   DID A NON-ROUTINE EVENT OCCUR? No           Last vitals:  Vitals Value Taken Time   /86 12/15/22 1225   Temp 36.8  C (98.3  F) 12/15/22 1225   Pulse 88 12/15/22 1225   Resp 18 12/15/22 1225   SpO2 93 % 12/15/22 1229   Vitals shown include unvalidated device data.    Electronically Signed By: Saulo Lyles DO  December 15, 2022  9:05 PM

## 2022-12-29 ENCOUNTER — TELEPHONE (OUTPATIENT)
Dept: SURGERY | Facility: CLINIC | Age: 32
End: 2022-12-29

## 2022-12-29 NOTE — TELEPHONE ENCOUNTER
[facility]  Post op call note     Skyla Powers was called for an update regarding his recovery.  He underwent a Lap [place] by Dr. Shaffer on December / 15 / 2022.  Today he tells me he is doing well and denies any complaints.  He is eating a normal diet and his bowels are regular.  He states his wounds are healing well and denies any erythema or drainage at his wounds.     The pathology revealed Acute cholecystitis with cholelithiasis.  This was discussed with the patient.     He was instructed to gradually resume all normal activities. The patient states all of his questions were answered and he agrees to follow up in clinic as needed.    Moisés Retana PA-C        Please route or send letter to:  Primary Care Provider (PCP)

## (undated) DEVICE — SOL WATER IRRIG 1000ML BOTTLE 2F7114

## (undated) DEVICE — SUCTION IRR STRYKERFLOW II W/TIP 250-070-520

## (undated) DEVICE — ESU GROUND PAD UNIVERSAL W/O CORD

## (undated) DEVICE — CLIP APPLIER ENDO ROTATING 10MM MED/LG ER320

## (undated) DEVICE — SOL NACL 0.9% INJ 1000ML BAG 2B1324X

## (undated) DEVICE — ENDO TROCAR FIRST ENTRY KII FIOS Z-THRD 05X100MM CTF03

## (undated) DEVICE — ENDO SCOPE WARMER LF TM500

## (undated) DEVICE — PACK LAP CHOLE SLC15LCFSD

## (undated) DEVICE — SU VICRYL 0 UR-6 27" J603H

## (undated) DEVICE — ENDO TROCAR SLEEVE KII Z-THREADED 05X100MM CTS02

## (undated) DEVICE — PREP CHLORAPREP 26ML TINTED HI-LITE ORANGE 930815

## (undated) DEVICE — ENDO TROCAR FIRST ENTRY KII FIOS Z-THRD 11X100MM CTF33

## (undated) DEVICE — SUCTION CANISTER MEDIVAC LINER 3000ML W/LID 65651-530

## (undated) DEVICE — GLOVE GAMMEX DERMAPRENE ULTRA SZ 8.5 LF 8517

## (undated) DEVICE — ESU HOLDER LAP INST DISP PURPLE LONG 330MM H-PRO-330

## (undated) DEVICE — LINEN TOWEL PACK X5 5464

## (undated) DEVICE — BLADE KNIFE SURG 11 371111

## (undated) DEVICE — EVAC SYSTEM CLEAR FLOW SC082500

## (undated) DEVICE — SU VICRYL 4-0 PS-2 18" UND J496H